# Patient Record
Sex: FEMALE | Race: WHITE | Employment: UNEMPLOYED | URBAN - METROPOLITAN AREA
[De-identification: names, ages, dates, MRNs, and addresses within clinical notes are randomized per-mention and may not be internally consistent; named-entity substitution may affect disease eponyms.]

---

## 2018-03-07 PROBLEM — M54.30 SCIATICA: Status: ACTIVE | Noted: 2017-05-01

## 2018-03-07 PROBLEM — M51.36 DDD (DEGENERATIVE DISC DISEASE), LUMBAR: Status: ACTIVE | Noted: 2017-05-01

## 2018-09-25 ENCOUNTER — HOSPITAL ENCOUNTER (OUTPATIENT)
Dept: GENERAL RADIOLOGY | Age: 53
Discharge: HOME | End: 2018-09-25
Payer: COMMERCIAL

## 2018-09-25 DIAGNOSIS — M54.2 NECK PAIN: ICD-10-CM

## 2018-09-25 DIAGNOSIS — R20.0 NUMBNESS OF BOTH LOWER EXTREMITIES: ICD-10-CM

## 2018-09-25 PROCEDURE — 72050 X-RAY EXAM NECK SPINE 4/5VWS: CPT

## 2019-08-13 ENCOUNTER — HOSPITAL ENCOUNTER (OUTPATIENT)
Dept: CT IMAGING | Age: 54
Discharge: HOME | End: 2019-08-13

## 2019-08-13 DIAGNOSIS — E78.41 ELEVATED LIPOPROTEIN(A): ICD-10-CM

## 2019-08-14 NOTE — PROGRESS NOTES
Calcium score =zero. Please notify patient that we will continue to monitor lipids.  [facility]

## 2019-08-15 NOTE — PROGRESS NOTES
Sent [employer] message letting patient know her result of calcium score was zero and that we will continue to monitor her lipids.

## 2022-03-19 PROBLEM — M51.36 DDD (DEGENERATIVE DISC DISEASE), LUMBAR: Status: ACTIVE | Noted: 2017-05-01

## 2022-03-19 PROBLEM — M54.30 SCIATICA: Status: ACTIVE | Noted: 2017-05-01

## 2022-09-20 DIAGNOSIS — Z00.00 ANNUAL PHYSICAL EXAM: Primary | ICD-10-CM

## 2022-09-21 ENCOUNTER — NURSE ONLY (OUTPATIENT)
Dept: FAMILY MEDICINE CLINIC | Facility: CLINIC | Age: 57
End: 2022-09-21

## 2022-09-21 DIAGNOSIS — Z00.00 ANNUAL PHYSICAL EXAM: ICD-10-CM

## 2022-09-21 LAB
ALBUMIN SERPL-MCNC: 3.6 G/DL (ref 3.5–5)
ALBUMIN/GLOB SERPL: 1.1 {RATIO} (ref 1.2–3.5)
ALP SERPL-CCNC: 63 U/L (ref 50–136)
ALT SERPL-CCNC: 18 U/L (ref 12–65)
ANION GAP SERPL CALC-SCNC: 0 MMOL/L (ref 4–13)
APPEARANCE UR: CLEAR
AST SERPL-CCNC: 10 U/L (ref 15–37)
BACTERIA URNS QL MICRO: NEGATIVE /HPF
BASOPHILS # BLD: 0.1 K/UL (ref 0–0.2)
BASOPHILS NFR BLD: 1 % (ref 0–2)
BILIRUB SERPL-MCNC: 0.4 MG/DL (ref 0.2–1.1)
BILIRUB UR QL: NEGATIVE
BUN SERPL-MCNC: 15 MG/DL (ref 6–23)
CALCIUM SERPL-MCNC: 9 MG/DL (ref 8.3–10.4)
CASTS URNS QL MICRO: ABNORMAL /LPF
CHLORIDE SERPL-SCNC: 107 MMOL/L (ref 101–110)
CHOLEST SERPL-MCNC: 246 MG/DL
CO2 SERPL-SCNC: 30 MMOL/L (ref 21–32)
COLOR UR: ABNORMAL
CREAT SERPL-MCNC: 0.8 MG/DL (ref 0.6–1)
DIFFERENTIAL METHOD BLD: ABNORMAL
EOSINOPHIL # BLD: 0.1 K/UL (ref 0–0.8)
EOSINOPHIL NFR BLD: 2 % (ref 0.5–7.8)
EPI CELLS #/AREA URNS HPF: ABNORMAL /HPF
ERYTHROCYTE [DISTWIDTH] IN BLOOD BY AUTOMATED COUNT: 13.6 % (ref 11.9–14.6)
GLOBULIN SER CALC-MCNC: 3.3 G/DL (ref 2.3–3.5)
GLUCOSE SERPL-MCNC: 87 MG/DL (ref 65–100)
GLUCOSE UR STRIP.AUTO-MCNC: NEGATIVE MG/DL
HCT VFR BLD AUTO: 43.6 % (ref 35.8–46.3)
HDLC SERPL-MCNC: 75 MG/DL (ref 40–60)
HDLC SERPL: 3.3 {RATIO}
HGB BLD-MCNC: 13.6 G/DL (ref 11.7–15.4)
HGB UR QL STRIP: NEGATIVE
IMM GRANULOCYTES # BLD AUTO: 0 K/UL (ref 0–0.5)
IMM GRANULOCYTES NFR BLD AUTO: 0 % (ref 0–5)
KETONES UR QL STRIP.AUTO: NEGATIVE MG/DL
LDLC SERPL CALC-MCNC: 157.8 MG/DL
LEUKOCYTE ESTERASE UR QL STRIP.AUTO: NEGATIVE
LYMPHOCYTES # BLD: 2.6 K/UL (ref 0.5–4.6)
LYMPHOCYTES NFR BLD: 45 % (ref 13–44)
MCH RBC QN AUTO: 29.5 PG (ref 26.1–32.9)
MCHC RBC AUTO-ENTMCNC: 31.2 G/DL (ref 31.4–35)
MCV RBC AUTO: 94.6 FL (ref 79.6–97.8)
MONOCYTES # BLD: 0.4 K/UL (ref 0.1–1.3)
MONOCYTES NFR BLD: 8 % (ref 4–12)
MUCOUS THREADS URNS QL MICRO: 0 /LPF
NEUTS SEG # BLD: 2.5 K/UL (ref 1.7–8.2)
NEUTS SEG NFR BLD: 44 % (ref 43–78)
NITRITE UR QL STRIP.AUTO: NEGATIVE
NRBC # BLD: 0 K/UL (ref 0–0.2)
PH UR STRIP: 5.5 [PH] (ref 5–9)
PLATELET # BLD AUTO: 295 K/UL (ref 150–450)
PMV BLD AUTO: 9.9 FL (ref 9.4–12.3)
POTASSIUM SERPL-SCNC: 4.2 MMOL/L (ref 3.5–5.1)
PROT SERPL-MCNC: 6.9 G/DL (ref 6.3–8.2)
PROT UR STRIP-MCNC: NEGATIVE MG/DL
RBC # BLD AUTO: 4.61 M/UL (ref 4.05–5.2)
RBC #/AREA URNS HPF: ABNORMAL /HPF
SODIUM SERPL-SCNC: 137 MMOL/L (ref 136–145)
SP GR UR REFRACTOMETRY: 1.02 (ref 1–1.02)
TRIGL SERPL-MCNC: 66 MG/DL (ref 35–150)
TSH, 3RD GENERATION: 2.12 UIU/ML (ref 0.36–3.74)
URINE CULTURE IF INDICATED: ABNORMAL
UROBILINOGEN UR QL STRIP.AUTO: 0.2 EU/DL (ref 0.2–1)
VLDLC SERPL CALC-MCNC: 13.2 MG/DL (ref 6–23)
WBC # BLD AUTO: 5.7 K/UL (ref 4.3–11.1)
WBC URNS QL MICRO: ABNORMAL /HPF

## 2022-09-26 ENCOUNTER — OFFICE VISIT (OUTPATIENT)
Dept: FAMILY MEDICINE CLINIC | Facility: CLINIC | Age: 57
End: 2022-09-26
Payer: COMMERCIAL

## 2022-09-26 VITALS
HEIGHT: 67 IN | TEMPERATURE: 97.2 F | SYSTOLIC BLOOD PRESSURE: 110 MMHG | OXYGEN SATURATION: 98 % | WEIGHT: 147 LBS | BODY MASS INDEX: 23.07 KG/M2 | HEART RATE: 78 BPM | DIASTOLIC BLOOD PRESSURE: 72 MMHG

## 2022-09-26 DIAGNOSIS — M51.36 DDD (DEGENERATIVE DISC DISEASE), LUMBAR: ICD-10-CM

## 2022-09-26 DIAGNOSIS — Z00.00 ANNUAL PHYSICAL EXAM: ICD-10-CM

## 2022-09-26 DIAGNOSIS — I44.7 LEFT BUNDLE-BRANCH BLOCK, UNSPECIFIED: ICD-10-CM

## 2022-09-26 DIAGNOSIS — R20.0 NUMBNESS OF BOTH LOWER EXTREMITIES: Primary | ICD-10-CM

## 2022-09-26 PROCEDURE — 99396 PREV VISIT EST AGE 40-64: CPT | Performed by: FAMILY MEDICINE

## 2022-09-26 ASSESSMENT — PATIENT HEALTH QUESTIONNAIRE - PHQ9
1. LITTLE INTEREST OR PLEASURE IN DOING THINGS: 0
SUM OF ALL RESPONSES TO PHQ9 QUESTIONS 1 & 2: 0
SUM OF ALL RESPONSES TO PHQ QUESTIONS 1-9: 0
2. FEELING DOWN, DEPRESSED OR HOPELESS: 0

## 2022-10-16 ASSESSMENT — ENCOUNTER SYMPTOMS
BACK PAIN: 1
GASTROINTESTINAL NEGATIVE: 1
SHORTNESS OF BREATH: 0
EYES NEGATIVE: 1
WHEEZING: 0
VOMITING: 0
ABDOMINAL DISTENTION: 0
EYE REDNESS: 0
SORE THROAT: 0
RHINORRHEA: 0
RESPIRATORY NEGATIVE: 1

## 2022-10-16 NOTE — PROGRESS NOTES
HISTORY OF PRESENT ILLNESS  Hill Rodriguez is a 62 y.o. y.o. female    Other  This is a new (physical exam) problem. The current episode started today. The problem has been unchanged. Pertinent negatives include no arthralgias, chest pain, chills, fatigue, rash, sore throat or vomiting. Back Pain  This is a recurrent problem. The problem is unchanged. The pain is at a severity of 3/10. The pain is mild. Pertinent negatives include no chest pain. Allergies   Allergen Reactions    Docusate Calcium Rash        Current Outpatient Medications   Medication Sig    ibuprofen (ADVIL;MOTRIN) 200 MG tablet Take by mouth as needed     No current facility-administered medications for this visit. Past Medical History:   Diagnosis Date    DDD (degenerative disc disease), lumbar 05/2017    POA    Heart palpitations     Left bundle branch block     Sciatica 05/2017    left, PT tx        Past Surgical History:   Procedure Laterality Date    COLONOSCOPY  2015    repeat in 10 yrs.     GI      anal fissure repair    GYN      tubal ligation    MYOMECTOMY 1-4,W/TOT 250GMS/<,ABD APPRCH      hysteroscopy, D&C, myomectomy        Social History     Socioeconomic History    Marital status:      Spouse name: Not on file    Number of children: Not on file    Years of education: Not on file    Highest education level: Not on file   Occupational History    Not on file   Tobacco Use    Smoking status: Never    Smokeless tobacco: Never   Substance and Sexual Activity    Alcohol use: No     Alcohol/week: 0.0 standard drinks    Drug use: No    Sexual activity: Not on file   Other Topics Concern    Not on file   Social History Narrative    Not on file     Social Determinants of Health     Financial Resource Strain: Not on file   Food Insecurity: Not on file   Transportation Needs: Not on file   Physical Activity: Not on file   Stress: Not on file   Social Connections: Not on file   Intimate Partner Violence: Not on file   Housing Stability: Not on file        Review of Systems   Constitutional: Negative. Negative for activity change, appetite change, chills, fatigue and unexpected weight change. HENT: Negative. Negative for rhinorrhea and sore throat. Eyes: Negative. Negative for redness and visual disturbance. Respiratory: Negative. Negative for shortness of breath and wheezing. Cardiovascular: Negative. Negative for chest pain and palpitations. Gastrointestinal: Negative. Negative for abdominal distention and vomiting. Endocrine: Negative. Genitourinary: Negative. Negative for difficulty urinating. Musculoskeletal:  Positive for back pain. Negative for arthralgias. Skin: Negative. Negative for rash. Neurological: Negative. Psychiatric/Behavioral: Negative. Negative for agitation and behavioral problems.       /72   Pulse 78   Temp 97.2 °F (36.2 °C) (Temporal)   Ht 5' 7\" (1.702 m)   Wt 147 lb (66.7 kg)   SpO2 98%   BMI 23.02 kg/m²      Physical Exam    Nurse Only on 09/21/2022   Component Date Value Ref Range Status    TSH, 3RD GENERATION 09/21/2022 2.120  0.358 - 3.740 uIU/mL Final    Color, UA 09/21/2022 YELLOW/STRAW    Final    Color Reference Range: Straw, Yellow or Dark Yellow    Appearance 09/21/2022 CLEAR    Final    Specific Gravity, UA 09/21/2022 1.017  1.001 - 1.023   Final    pH, Urine 09/21/2022 5.5  5.0 - 9.0   Final    Protein, UA 09/21/2022 Negative  Negative mg/dL Final    Glucose, UA 09/21/2022 Negative  mg/dL Final    Ketones, Urine 09/21/2022 Negative  Negative mg/dL Final    Bilirubin Urine 09/21/2022 Negative  Negative   Final    Blood, Urine 09/21/2022 Negative  Negative   Final    Urobilinogen, Urine 09/21/2022 0.2  0.2 - 1.0 EU/dL Final    Nitrite, Urine 09/21/2022 Negative  Negative   Final    Leukocyte Esterase, Urine 09/21/2022 Negative  Negative   Final    Urine Culture if Indicated 09/21/2022 CULTURE NOT INDICATED BY UA RESULT    Final    WBC, UA 09/21/2022 0-4 (A) NORMAL /hpf Final    RBC, UA 09/21/2022 0-5 (A)  NORMAL /hpf Final    BACTERIA, URINE 09/21/2022 Negative (A)  NORMAL /hpf Final    Epithelial Cells UA 09/21/2022 0-5 (A)  NORMAL /hpf Final    Casts 09/21/2022 0-2 (A)  NORMAL /lpf Final    Mucus, UA 09/21/2022 0  0 /lpf Final    Sodium 09/21/2022 137  136 - 145 mmol/L Final    Potassium 09/21/2022 4.2  3.5 - 5.1 mmol/L Final    Chloride 09/21/2022 107  101 - 110 mmol/L Final    CO2 09/21/2022 30  21 - 32 mmol/L Final    Anion Gap 09/21/2022 0 (A)  4 - 13 mmol/L Final    Glucose 09/21/2022 87  65 - 100 mg/dL Final    BUN 09/21/2022 15  6 - 23 MG/DL Final    Creatinine 09/21/2022 0.80  0.6 - 1.0 MG/DL Final    GFR  09/21/2022 >60  >60 ml/min/1.73m2 Final    GFR Non- 09/21/2022 >60  >60 ml/min/1.73m2 Final    Comment:   Estimated GFR is calculated using the Modification of Diet in Renal Disease (MDRD) Study equation, reported for both  Americans (GFRAA) and non- Americans (GFRNA), and normalized to 1.73m2 body surface area. The physician must decide which value applies to the patient. The MDRD study equation should only be used in individuals age 25 or older. It has not been validated for the following: pregnant women, patients with serious comorbid conditions,or on certain medications, or persons with extremes of body size, muscle mass, or nutritional status.       Calcium 09/21/2022 9.0  8.3 - 10.4 MG/DL Final    Total Bilirubin 09/21/2022 0.4  0.2 - 1.1 MG/DL Final    ALT 09/21/2022 18  12 - 65 U/L Final    AST 09/21/2022 10 (A)  15 - 37 U/L Final    Alk Phosphatase 09/21/2022 63  50 - 136 U/L Final    Total Protein 09/21/2022 6.9  6.3 - 8.2 g/dL Final    Albumin 09/21/2022 3.6  3.5 - 5.0 g/dL Final    Globulin 09/21/2022 3.3  2.3 - 3.5 g/dL Final    Albumin/Globulin Ratio 09/21/2022 1.1 (A)  1.2 - 3.5   Final    Cholesterol, Total 09/21/2022 246 (A)  <200 MG/DL Final    Comment: Borderline High: 200-239 mg/dL  High: Greater than or equal to 240 mg/dL      Triglycerides 09/21/2022 66  35 - 150 MG/DL Final    Comment: Borderline High: 150-199 mg/dL, High: 200-499 mg/dL  Very High: Greater than or equal to 500 mg/dL      HDL 09/21/2022 75 (A)  40 - 60 MG/DL Final    LDL Calculated 09/21/2022 157.8 (A)  <100 MG/DL Final    Comment: Near Optimal: 100-129 mg/dL  Borderline High: 130-159, High: 160-189 mg/dL  Very High: Greater than or equal to 190 mg/dL      VLDL Cholesterol Calculated 09/21/2022 13.2  6.0 - 23.0 MG/DL Final    Chol/HDL Ratio 09/21/2022 3.3    Final    WBC 09/21/2022 5.7  4.3 - 11.1 K/uL Final    RBC 09/21/2022 4.61  4.05 - 5.2 M/uL Final    Hemoglobin 09/21/2022 13.6  11.7 - 15.4 g/dL Final    Hematocrit 09/21/2022 43.6  35.8 - 46.3 % Final    MCV 09/21/2022 94.6  79.6 - 97.8 FL Final    MCH 09/21/2022 29.5  26.1 - 32.9 PG Final    MCHC 09/21/2022 31.2 (A)  31.4 - 35.0 g/dL Final    RDW 09/21/2022 13.6  11.9 - 14.6 % Final    Platelets 93/22/7562 295  150 - 450 K/uL Final    MPV 09/21/2022 9.9  9.4 - 12.3 FL Final    nRBC 09/21/2022 0.00  0.0 - 0.2 K/uL Final    **Note: Absolute NRBC parameter is now reported with Hemogram**    Differential Type 09/21/2022 AUTOMATED    Final    Seg Neutrophils 09/21/2022 44  43 - 78 % Final    Lymphocytes 09/21/2022 45 (A)  13 - 44 % Final    Monocytes 09/21/2022 8  4.0 - 12.0 % Final    Eosinophils % 09/21/2022 2  0.5 - 7.8 % Final    Basophils 09/21/2022 1  0.0 - 2.0 % Final    Immature Granulocytes 09/21/2022 0  0.0 - 5.0 % Final    Segs Absolute 09/21/2022 2.5  1.7 - 8.2 K/UL Final    Absolute Lymph # 09/21/2022 2.6  0.5 - 4.6 K/UL Final    Absolute Mono # 09/21/2022 0.4  0.1 - 1.3 K/UL Final    Absolute Eos # 09/21/2022 0.1  0.0 - 0.8 K/UL Final    Basophils Absolute 09/21/2022 0.1  0.0 - 0.2 K/UL Final    Absolute Immature Granulocyte 09/21/2022 0.0  0.0 - 0.5 K/UL Final       ASSESSMENT and PLAN    Numbness of both lower extremities  -     [facility] [place], Neurology, [facility]  Annual physical exam  Left bundle-branch block, unspecified  DDD (degenerative disc disease), lumbar    The patient was seen today for the annual preventive health visit. The patient was provided anticipatory guidance and counseling regarding age / sex appropriate health maintenance issues including vaccinations, blood pressure screening, lipid screening, cancer screenings, diet, exercise, avoidance of tobacco / substance abuse. Current treatment plan is effective. no changes in therapy  lab results and schedule for future lab studies reviewed with patient  reviewed diet, exercise and weight control     No follow-ups on file.      Grayson Reyes MD

## 2023-01-12 ENCOUNTER — PROCEDURE VISIT (OUTPATIENT)
Dept: NEUROLOGY | Age: 58
End: 2023-01-12
Payer: COMMERCIAL

## 2023-01-12 VITALS
HEIGHT: 67 IN | DIASTOLIC BLOOD PRESSURE: 70 MMHG | HEART RATE: 57 BPM | SYSTOLIC BLOOD PRESSURE: 100 MMHG | WEIGHT: 149 LBS | BODY MASS INDEX: 23.39 KG/M2

## 2023-01-12 DIAGNOSIS — R20.2 NUMBNESS AND TINGLING OF BOTH FEET: Primary | ICD-10-CM

## 2023-01-12 DIAGNOSIS — E53.9 BURNING FEET SYNDROME: ICD-10-CM

## 2023-01-12 DIAGNOSIS — R20.0 NUMBNESS AND TINGLING OF BOTH FEET: Primary | ICD-10-CM

## 2023-01-12 PROCEDURE — 95885 MUSC TST DONE W/NERV TST LIM: CPT | Performed by: PSYCHIATRY & NEUROLOGY

## 2023-01-12 PROCEDURE — 95912 NRV CNDJ TEST 11-12 STUDIES: CPT | Performed by: PSYCHIATRY & NEUROLOGY

## 2023-01-12 PROCEDURE — 99203 OFFICE O/P NEW LOW 30 MIN: CPT | Performed by: PSYCHIATRY & NEUROLOGY

## 2023-01-12 ASSESSMENT — ENCOUNTER SYMPTOMS
EYES NEGATIVE: 1
RESPIRATORY NEGATIVE: 1

## 2023-01-12 ASSESSMENT — VISUAL ACUITY: OU: 1

## 2023-01-12 NOTE — PROGRESS NOTES
NEUROLOGY  RETURN  OFFICE VISIT [de-identified]                     1/12/2023  Anders Gan is a 62 y.o. female here for [de-identified] Problems continuing from previous surgery 4 years ago or so at which time she had lower extremity pains. 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 Referred by   Grayson Reyes MD       Chief Complaint:   Burning feet syndrome and other neuralgias. Intermittent. For lower extremity EMG as well. See below. 8/7/2018--  last visit ==     1. Pain of left lower extremity  -     Motor &/sens 7-8 nerve conduction test  -     Needle EMG w/ nerve conduction test, complex  2. Sciatica of left side  -     Motor &/sens 7-8 nerve conduction test  -     Needle EMG w/ nerve conduction test, complex  3. Burning feet syndrome  -     Motor &/sens 7-8 nerve conduction test  -     Needle EMG w/ nerve conduction test, complex  back and sciatic pain but normal rotation of the hip normal today. Now pains +/- feet. .     High arches . .  mother also feet prob. Some burning intermittent under arms as well. On no meds. .         Active Problems:    * No active hospital problems. *  Resolved Problems:    * No resolved hospital problems. *    Past Medical History:   Diagnosis Date    DDD (degenerative disc disease), lumbar 05/2017    POA    Heart palpitations     Left bundle branch block     Sciatica 05/2017    left, PT tx     Past Surgical History:   Procedure Laterality Date    COLONOSCOPY  2015    repeat in 10 yrs.     GI      anal fissure repair    GYN      tubal ligation    MYOMECTOMY 1-4,W/TOT 250GMS/<,ABD APPRCH      hysteroscopy, D&C, myomectomy      Family History   Problem Relation Age of Onset    Interstitial Cystits Mother     No Known Problems Father     No Known Problems Brother      Social History     Socioeconomic History    Marital status:      Spouse name: None    Number of children: None    Years of education: None    Highest education level: None   Tobacco Use    Smoking status: Never Smokeless tobacco: Never   Substance and Sexual Activity    Alcohol use: No     Alcohol/week: 0.0 standard drinks    Drug use: No        Current Outpatient Medications   Medication Sig Dispense Refill    ibuprofen (ADVIL;MOTRIN) 200 MG tablet Take by mouth as needed       No current facility-administered medications for this visit. Allergies   Allergen Reactions    Docusate Calcium Rash       REVIEW OTHER RECORDS:  reviewed - no specifics new. No excess B6. Review of Systems   HENT: Negative. Eyes: Negative. Respiratory: Negative. Musculoskeletal: Negative. Neurological:  Positive for tingling and sensory change. Negative for dizziness, tremors, speech change, focal weakness, seizures, loss of consciousness, weakness and headaches. Psychiatric/Behavioral: Negative. All other systems reviewed and are negative. REVIEW IMAGING: no new. Objective:     Vitals:    01/12/23 1032   BP: 100/70   Pulse: 57   Weight: 149 lb (67.6 kg)   Height: 5' 7\" (1.702 m)        Physical Exam  Vitals reviewed. Constitutional:       General: She is awake. She is not in acute distress. Appearance: She is well-developed and well-groomed. She is not ill-appearing, toxic-appearing or diaphoretic. HENT:      Head: Normocephalic and atraumatic. No raccoon eyes, abrasion, contusion, right periorbital erythema, left periorbital erythema or laceration. Right Ear: Hearing normal.      Left Ear: Hearing normal.   Eyes:      General: Lids are normal. Vision grossly intact. No visual field deficit or scleral icterus. Right eye: No discharge. Left eye: No discharge. Extraocular Movements: Extraocular movements intact. Right eye: Normal extraocular motion and no nystagmus. Left eye: Normal extraocular motion and no nystagmus. Conjunctiva/sclera: Conjunctivae normal.      Right eye: Right conjunctiva is not injected. Left eye: Left conjunctiva is not injected. Pupils: Pupils are equal, round, and reactive to light. Neck:      Trachea: Phonation normal.   Pulmonary:      Effort: Pulmonary effort is normal. No respiratory distress. Breath sounds: No wheezing. Musculoskeletal:         General: No swelling, tenderness, deformity or signs of injury. Normal range of motion. Cervical back: Normal range of motion and neck supple. No signs of trauma, rigidity, torticollis or tenderness. Normal range of motion. Right lower leg: No edema. Left lower leg: No edema. Skin:     General: Skin is warm and dry. Capillary Refill: Capillary refill takes less than 2 seconds. Coloration: Skin is not cyanotic, jaundiced or pale. Nails: There is no clubbing. Comments: Nails normal.  No Mees lines. Neurological:      Mental Status: She is alert and easily aroused. Mental status is at baseline. Cranial Nerves: Cranial nerves 2-12 are intact. No cranial nerve deficit, dysarthria or facial asymmetry. Sensory: Sensation is intact. No sensory deficit. Motor: Motor function is intact. No weakness, atrophy, abnormal muscle tone or seizure activity. Coordination: Coordination is intact. Coordination normal.      Gait: Gait is intact. Gait normal.      Deep Tendon Reflexes: Reflexes are normal and symmetric. Babinski sign absent on the right side. Babinski sign absent on the left side. Reflex Scores:       Tricep reflexes are 2+ on the right side and 2+ on the left side. Bicep reflexes are 2+ on the right side and 2+ on the left side. Brachioradialis reflexes are 2+ on the right side and 2+ on the left side. Patellar reflexes are 2+ on the right side and 2+ on the left side. Achilles reflexes are 2+ on the right side and 2+ on the left side. Comments: No Mildred. PERRLA. No Marie. Normal gait.     Psychiatric:         Attention and Perception: Attention normal.         Mood and Affect: Mood normal. Speech: Speech normal. She is communicative. Behavior: Behavior normal. Behavior is cooperative. Neurologic Exam     Mental Status   Speech: speech is normal   Level of consciousness: alert  Knowledge: good. Normal comprehension. Cranial Nerves   Cranial nerves II through XII intact. CN III, IV, VI   Pupils are equal, round, and reactive to light. Gait, Coordination, and Reflexes     Gait  Gait: normal    Tremor   Resting tremor: absent  Intention tremor: absent  Action tremor: absent    Reflexes   Right brachioradialis: 2+  Left brachioradialis: 2+  Right biceps: 2+  Left biceps: 2+  Right triceps: 2+  Left triceps: 2+  Right patellar: 2+  Left patellar: 2+  Right achilles: 2+  Left achilles: 2+  Right Marie: absent  Left Marie: absent  Right ankle clonus: absent  Left ankle clonus: absent  There is no tic, twitch, tonic or clonic activity noted. No dyskinesia. Assessment & Plan     Diagnoses and all orders for this visit:    Numbness and tingling of both feet  -     Motor &/sens 9-10 nerve conduction test  -     Needle EMG w/ nerve conduction test, limited    Burning feet syndrome    Try some Calamine lotion/Calazinc or Calazime cream to feet. 1/4th inch 4x daily. Others. ..  gabapentin or pregabalin or baclofen, duloxetine, tricyclic antidepressant, etc might help. Neuropathic pain. She noted resistance to wanting to take Rx for such pain . I can see PRN -  as this might be inherited neuropathy such as HMSN . All drugs and rx reviewed fully with patient and acknowledged specific to neurology as well. Differential diagnostic decisions and thoughts reviewed and discussed. Updating to patient identification, coordinate neurology visits, reasons for follow, review neurologic problems.           Extensive time[de-identified]  Total time  38 minutes -   Extra to EMG Time*           Patient is referred by the following provider for consultation regarding as below:      [[Please note that the consultation is a separate note/E-M from the EMG procedure. ]]           More than 50% of this visit was spent in counseling and care coordination. Treatment options fully reviewed with patient. Time includes pre-  and post- face-face time in records review, and preparation including available pertinent images and reports. Acknowledgements obtained where needed.    [ *NOTE: parts of this note were produced using artificial speech recognition software, which may have inadvertent errors in the produced wordings. ]          Natalio Gonzalez MD  Consultative Neurology, [address] [facility] [place] [facility] [address] [place] [address]  Phone:  [phone]  Fax:   [phone]        Signed By: Natalio Gonzalez MD     January 12, 2023

## 2023-01-12 NOTE — PROGRESS NOTES
EMG/Nerve Conduction Study Procedure Note  [address]   [phone]      Hx:    Exam:     62 y.o.r.h. female returns for burning and tingling of feet. R/LBP. Denies DM. Previous study 2018 normal. Denies DM. Summary              needle EMG lower extremities as below with conduction velocity testing. Controlled environmental factors / EMG lab. Temperature. NCV : sensory segments:    Normal bilateral sural SCB SNAP. NCV plantar sensory segments:     Normal bilateral plantar SCV all segments. NCV Motor MCV segments:     Normal bilateral peroneal and tibial MCV CMAP. F-wave studies:         Normal bilateral peroneal and tibial F waves. H-REFLEX Studies:    Normal bilateral H-reflexes. NEEDLE EMG:   Tested muscles[de-identified]    Normal tested bilateral lower extremity muscles = TA MG VL =    Normal insertional activity and interference pattern/recruitment. No fasciculations fibrillations positive sharp waves. Normal MUP. No BSS AP. No giant MUP. No myotonia. No upper motor neuron sign. INTERPRETATION:           INTERPRETATION:       NORMAL STUDIES. THERE IS NO ELECTROPHYSIOLOGIC EVIDENCE OF NEUROPATHY, DENERVATION, RADICULOPATHY, PLEXOPATHY, MYOPATHY, MYOTONIA, OR FASCICULATIONS IN THE TESTED SEGMENTS  OF  LOWER   EXTREMITIES. CONCLUSION:      NORMAL LOWER STUDIES. Procedure Details:       FURTHER clinical correlation is recommended and warranted as studies are within normal limits. Patient is made aware. Please Note[de-identified]     Data and waveforms * filed under Procedure category [facility]. See Procedure Files for complete data pages.                 Candace Alvarez MD  Consultative Neurology, Neurodiagnostics   [facility]    [address] [place] [address]  Phone:  [phone]  Fax: [phone]          + + +   Glossary:   MUP: motor unit potential;  SNAP: sensory nerve action potential; Fibs:  Fibrillations; Fascic: fasciculations; IA: insertional activity;  IP: interference pattern;  SCV :  Sensory conduction velocity;  MCV: motor conduction velocity; NOTE[de-identified] muscles are abbreviated latin initials. [place]ico[place] raw datafile[de-identified]   * filed at Procedure or [employer].  *

## 2023-01-12 NOTE — LETTER
NORMAL emg LOWERS.          Patricia Cunningham MD  Consultative Neurology, Neurodiagnostics   [facility]    [address] [place] [address], [address] [place] [address]  Phone:  [phone]  Fax:   [phone]

## 2023-09-26 ENCOUNTER — NURSE ONLY (OUTPATIENT)
Dept: FAMILY MEDICINE CLINIC | Facility: CLINIC | Age: 58
End: 2023-09-26

## 2023-09-26 DIAGNOSIS — Z13.89 SCREENING FOR BLOOD OR PROTEIN IN URINE: ICD-10-CM

## 2023-09-26 DIAGNOSIS — Z00.00 ANNUAL PHYSICAL EXAM: ICD-10-CM

## 2023-09-26 DIAGNOSIS — Z13.29 SCREENING FOR THYROID DISORDER: ICD-10-CM

## 2023-09-26 DIAGNOSIS — Z13.228 SCREENING FOR METABOLIC DISORDER: ICD-10-CM

## 2023-09-26 DIAGNOSIS — Z13.220 SCREENING FOR LIPID DISORDERS: ICD-10-CM

## 2023-09-26 DIAGNOSIS — Z00.00 ANNUAL PHYSICAL EXAM: Primary | ICD-10-CM

## 2023-09-26 LAB
ALBUMIN SERPL-MCNC: 3.8 G/DL (ref 3.5–5)
ALBUMIN/GLOB SERPL: 1.3 (ref 0.4–1.6)
ALP SERPL-CCNC: 66 U/L (ref 50–136)
ALT SERPL-CCNC: 24 U/L (ref 12–65)
ANION GAP SERPL CALC-SCNC: 5 MMOL/L (ref 2–11)
APPEARANCE UR: CLEAR
AST SERPL-CCNC: 16 U/L (ref 15–37)
BACTERIA URNS QL MICRO: NEGATIVE /HPF
BASOPHILS # BLD: 0.1 K/UL (ref 0–0.2)
BASOPHILS NFR BLD: 1 % (ref 0–2)
BILIRUB SERPL-MCNC: 0.4 MG/DL (ref 0.2–1.1)
BILIRUB UR QL: NEGATIVE
BUN SERPL-MCNC: 18 MG/DL (ref 6–23)
CALCIUM SERPL-MCNC: 9.1 MG/DL (ref 8.3–10.4)
CASTS URNS QL MICRO: ABNORMAL /LPF (ref 0–2)
CHLORIDE SERPL-SCNC: 110 MMOL/L (ref 101–110)
CHOLEST SERPL-MCNC: 263 MG/DL
CO2 SERPL-SCNC: 29 MMOL/L (ref 21–32)
COLOR UR: ABNORMAL
CREAT SERPL-MCNC: 0.8 MG/DL (ref 0.6–1)
DIFFERENTIAL METHOD BLD: NORMAL
EOSINOPHIL # BLD: 0.1 K/UL (ref 0–0.8)
EOSINOPHIL NFR BLD: 3 % (ref 0.5–7.8)
EPI CELLS #/AREA URNS HPF: ABNORMAL /HPF (ref 0–5)
ERYTHROCYTE [DISTWIDTH] IN BLOOD BY AUTOMATED COUNT: 13.1 % (ref 11.9–14.6)
GLOBULIN SER CALC-MCNC: 2.9 G/DL (ref 2.8–4.5)
GLUCOSE SERPL-MCNC: 87 MG/DL (ref 65–100)
GLUCOSE UR STRIP.AUTO-MCNC: NEGATIVE MG/DL
HCT VFR BLD AUTO: 43 % (ref 35.8–46.3)
HDLC SERPL-MCNC: 78 MG/DL (ref 40–60)
HDLC SERPL: 3.4
HGB BLD-MCNC: 13.7 G/DL (ref 11.7–15.4)
HGB UR QL STRIP: NEGATIVE
IMM GRANULOCYTES # BLD AUTO: 0 K/UL (ref 0–0.5)
IMM GRANULOCYTES NFR BLD AUTO: 0 % (ref 0–5)
KETONES UR QL STRIP.AUTO: NEGATIVE MG/DL
LDLC SERPL CALC-MCNC: 170.4 MG/DL
LEUKOCYTE ESTERASE UR QL STRIP.AUTO: ABNORMAL
LYMPHOCYTES # BLD: 2.3 K/UL (ref 0.5–4.6)
LYMPHOCYTES NFR BLD: 42 % (ref 13–44)
MCH RBC QN AUTO: 29.7 PG (ref 26.1–32.9)
MCHC RBC AUTO-ENTMCNC: 31.9 G/DL (ref 31.4–35)
MCV RBC AUTO: 93.1 FL (ref 82–102)
MONOCYTES # BLD: 0.4 K/UL (ref 0.1–1.3)
MONOCYTES NFR BLD: 8 % (ref 4–12)
MUCOUS THREADS URNS QL MICRO: 0 /LPF
NEUTS SEG # BLD: 2.5 K/UL (ref 1.7–8.2)
NEUTS SEG NFR BLD: 46 % (ref 43–78)
NITRITE UR QL STRIP.AUTO: NEGATIVE
NRBC # BLD: 0 K/UL (ref 0–0.2)
PH UR STRIP: 5 (ref 5–9)
PLATELET # BLD AUTO: 278 K/UL (ref 150–450)
PMV BLD AUTO: 9.8 FL (ref 9.4–12.3)
POTASSIUM SERPL-SCNC: 4.3 MMOL/L (ref 3.5–5.1)
PROT SERPL-MCNC: 6.7 G/DL (ref 6.3–8.2)
PROT UR STRIP-MCNC: NEGATIVE MG/DL
RBC # BLD AUTO: 4.62 M/UL (ref 4.05–5.2)
RBC #/AREA URNS HPF: ABNORMAL /HPF (ref 0–5)
SODIUM SERPL-SCNC: 144 MMOL/L (ref 133–143)
SP GR UR REFRACTOMETRY: 1.02 (ref 1–1.02)
TRIGL SERPL-MCNC: 73 MG/DL (ref 35–150)
TSH, 3RD GENERATION: 2.65 UIU/ML (ref 0.36–3.74)
URINE CULTURE IF INDICATED: ABNORMAL
UROBILINOGEN UR QL STRIP.AUTO: 0.2 EU/DL (ref 0.2–1)
VLDLC SERPL CALC-MCNC: 14.6 MG/DL (ref 6–23)
WBC # BLD AUTO: 5.5 K/UL (ref 4.3–11.1)
WBC URNS QL MICRO: ABNORMAL /HPF (ref 0–4)

## 2023-10-02 ASSESSMENT — PATIENT HEALTH QUESTIONNAIRE - PHQ9
1. LITTLE INTEREST OR PLEASURE IN DOING THINGS: NOT AT ALL
SUM OF ALL RESPONSES TO PHQ QUESTIONS 1-9: 0
1. LITTLE INTEREST OR PLEASURE IN DOING THINGS: 0
SUM OF ALL RESPONSES TO PHQ QUESTIONS 1-9: 0
SUM OF ALL RESPONSES TO PHQ9 QUESTIONS 1 & 2: 0
2. FEELING DOWN, DEPRESSED OR HOPELESS: NOT AT ALL
SUM OF ALL RESPONSES TO PHQ QUESTIONS 1-9: 0
2. FEELING DOWN, DEPRESSED OR HOPELESS: 0
SUM OF ALL RESPONSES TO PHQ9 QUESTIONS 1 & 2: 0
SUM OF ALL RESPONSES TO PHQ QUESTIONS 1-9: 0

## 2023-10-04 ENCOUNTER — OFFICE VISIT (OUTPATIENT)
Dept: FAMILY MEDICINE CLINIC | Facility: CLINIC | Age: 58
End: 2023-10-04
Payer: COMMERCIAL

## 2023-10-04 VITALS
TEMPERATURE: 97.3 F | HEIGHT: 67 IN | BODY MASS INDEX: 24.33 KG/M2 | WEIGHT: 155 LBS | DIASTOLIC BLOOD PRESSURE: 72 MMHG | OXYGEN SATURATION: 100 % | HEART RATE: 61 BPM | SYSTOLIC BLOOD PRESSURE: 110 MMHG

## 2023-10-04 DIAGNOSIS — M51.36 DDD (DEGENERATIVE DISC DISEASE), LUMBAR: ICD-10-CM

## 2023-10-04 DIAGNOSIS — Z00.00 ANNUAL PHYSICAL EXAM: ICD-10-CM

## 2023-10-04 DIAGNOSIS — Z12.31 SCREENING MAMMOGRAM FOR BREAST CANCER: Primary | ICD-10-CM

## 2023-10-04 PROCEDURE — 99396 PREV VISIT EST AGE 40-64: CPT | Performed by: FAMILY MEDICINE

## 2023-10-04 SDOH — ECONOMIC STABILITY: FOOD INSECURITY: WITHIN THE PAST 12 MONTHS, THE FOOD YOU BOUGHT JUST DIDN'T LAST AND YOU DIDN'T HAVE MONEY TO GET MORE.: NEVER TRUE

## 2023-10-04 SDOH — ECONOMIC STABILITY: HOUSING INSECURITY
IN THE LAST 12 MONTHS, WAS THERE A TIME WHEN YOU DID NOT HAVE A STEADY PLACE TO SLEEP OR SLEPT IN A SHELTER (INCLUDING NOW)?: NO

## 2023-10-04 SDOH — ECONOMIC STABILITY: FOOD INSECURITY: WITHIN THE PAST 12 MONTHS, YOU WORRIED THAT YOUR FOOD WOULD RUN OUT BEFORE YOU GOT MONEY TO BUY MORE.: NEVER TRUE

## 2023-10-04 SDOH — ECONOMIC STABILITY: INCOME INSECURITY: HOW HARD IS IT FOR YOU TO PAY FOR THE VERY BASICS LIKE FOOD, HOUSING, MEDICAL CARE, AND HEATING?: NOT HARD AT ALL

## 2023-10-10 ASSESSMENT — ENCOUNTER SYMPTOMS
BACK PAIN: 1
RESPIRATORY NEGATIVE: 1
GASTROINTESTINAL NEGATIVE: 1
EYES NEGATIVE: 1

## 2023-10-10 NOTE — PROGRESS NOTES
HISTORY OF PRESENT ILLNESS  Ray Flannery is a 62 y.o. y.o. female    Other  This is a new (physical) problem. The current episode started today. The problem has been gradually improving. Back Pain  This is a recurrent problem. The problem has been gradually improving since onset. The pain is present in the lumbar spine. The pain is at a severity of 4/10. The pain is moderate. Allergies   Allergen Reactions    Docusate Calcium Rash        Current Outpatient Medications   Medication Sig    ibuprofen (ADVIL;MOTRIN) 200 MG tablet Take by mouth as needed     No current facility-administered medications for this visit. Past Medical History:   Diagnosis Date    DDD (degenerative disc disease), lumbar 05/2017    POA    Heart palpitations     Left bundle branch block     Sciatica 05/2017    left, PT tx        Past Surgical History:   Procedure Laterality Date    COLONOSCOPY  2015    repeat in 10 yrs. GI      anal fissure repair    GYN      tubal ligation    MYOMECTOMY 1-4,W/TOT 250GMS/<,ABD APPRCH      hysteroscopy, D&C, myomectomy    TONSILLECTOMY  1970 ?     TUBAL LIGATION  July 2001        Social History     Socioeconomic History    Marital status:      Spouse name: Not on file    Number of children: Not on file    Years of education: Not on file    Highest education level: Not on file   Occupational History    Not on file   Tobacco Use    Smoking status: Never    Smokeless tobacco: Never   Substance and Sexual Activity    Alcohol use: No    Drug use: No    Sexual activity: Yes     Partners: Male   Other Topics Concern    Not on file   Social History Narrative    Not on file     Social Determinants of Health     Financial Resource Strain: Low Risk  (10/4/2023)    Overall Financial Resource Strain (CARDIA)     Difficulty of Paying Living Expenses: Not hard at all   Food Insecurity: No Food Insecurity (10/4/2023)    Hunger Vital Sign     Worried About Running Out of Food in the Last Year: Never

## 2024-08-29 ENCOUNTER — NURSE ONLY (OUTPATIENT)
Dept: FAMILY MEDICINE CLINIC | Facility: CLINIC | Age: 59
End: 2024-08-29

## 2024-08-29 DIAGNOSIS — R39.9 UTI SYMPTOMS: Primary | ICD-10-CM

## 2024-08-29 LAB
BILIRUBIN, URINE, POC: NEGATIVE
BLOOD URINE, POC: ABNORMAL
GLUCOSE URINE, POC: NEGATIVE
KETONES, URINE, POC: NEGATIVE
LEUKOCYTE ESTERASE, URINE, POC: ABNORMAL
NITRITE, URINE, POC: NEGATIVE
PH, URINE, POC: 5.5 (ref 4.6–8)
PROTEIN,URINE, POC: NEGATIVE
SPECIFIC GRAVITY, URINE, POC: 1.01 (ref 1–1.03)
URINALYSIS CLARITY, POC: CLEAR
URINALYSIS COLOR, POC: YELLOW
UROBILINOGEN, POC: NORMAL

## 2024-08-29 PROCEDURE — 81003 URINALYSIS AUTO W/O SCOPE: CPT | Performed by: FAMILY MEDICINE

## 2024-08-29 RX ORDER — CIPROFLOXACIN 250 MG/1
250 TABLET, FILM COATED ORAL 2 TIMES DAILY
Qty: 14 TABLET | Refills: 0 | Status: SHIPPED | OUTPATIENT
Start: 2024-08-29 | End: 2024-09-05

## 2024-09-01 LAB
BACTERIA SPEC CULT: ABNORMAL
BACTERIA SPEC CULT: ABNORMAL
SERVICE CMNT-IMP: ABNORMAL